# Patient Record
Sex: MALE | Race: WHITE | NOT HISPANIC OR LATINO | Employment: FULL TIME | URBAN - METROPOLITAN AREA
[De-identification: names, ages, dates, MRNs, and addresses within clinical notes are randomized per-mention and may not be internally consistent; named-entity substitution may affect disease eponyms.]

---

## 2024-08-29 ENCOUNTER — OFFICE VISIT (OUTPATIENT)
Dept: PRIMARY CARE | Facility: CLINIC | Age: 59
End: 2024-08-29
Payer: COMMERCIAL

## 2024-08-29 ENCOUNTER — LAB (OUTPATIENT)
Dept: LAB | Facility: LAB | Age: 59
End: 2024-08-29
Payer: COMMERCIAL

## 2024-08-29 VITALS
RESPIRATION RATE: 18 BRPM | OXYGEN SATURATION: 98 % | TEMPERATURE: 97.6 F | HEART RATE: 83 BPM | WEIGHT: 181 LBS | HEIGHT: 72 IN | DIASTOLIC BLOOD PRESSURE: 88 MMHG | BODY MASS INDEX: 24.52 KG/M2 | SYSTOLIC BLOOD PRESSURE: 127 MMHG

## 2024-08-29 DIAGNOSIS — E55.9 VITAMIN D DEFICIENCY: ICD-10-CM

## 2024-08-29 DIAGNOSIS — K63.5 POLYP OF COLON, UNSPECIFIED PART OF COLON, UNSPECIFIED TYPE: ICD-10-CM

## 2024-08-29 DIAGNOSIS — Z00.00 HEALTH CARE MAINTENANCE: Primary | ICD-10-CM

## 2024-08-29 DIAGNOSIS — J45.20 MILD INTERMITTENT ASTHMA WITHOUT COMPLICATION (HHS-HCC): ICD-10-CM

## 2024-08-29 DIAGNOSIS — R94.31 ABNORMAL EKG: ICD-10-CM

## 2024-08-29 DIAGNOSIS — Z12.5 SCREENING PSA (PROSTATE SPECIFIC ANTIGEN): ICD-10-CM

## 2024-08-29 DIAGNOSIS — Z86.19 HISTORY OF HERPES SIMPLEX INFECTION: ICD-10-CM

## 2024-08-29 DIAGNOSIS — Z00.00 HEALTH CARE MAINTENANCE: ICD-10-CM

## 2024-08-29 LAB
25(OH)D3 SERPL-MCNC: 86 NG/ML (ref 30–100)
ALBUMIN SERPL BCP-MCNC: 4.4 G/DL (ref 3.4–5)
ALP SERPL-CCNC: 53 U/L (ref 33–120)
ALT SERPL W P-5'-P-CCNC: 20 U/L (ref 10–52)
ANION GAP SERPL CALC-SCNC: 14 MMOL/L (ref 10–20)
AST SERPL W P-5'-P-CCNC: 25 U/L (ref 9–39)
BASOPHILS # BLD AUTO: 0.02 X10*3/UL (ref 0–0.1)
BASOPHILS NFR BLD AUTO: 0.4 %
BILIRUB SERPL-MCNC: 0.8 MG/DL (ref 0–1.2)
BUN SERPL-MCNC: 22 MG/DL (ref 6–23)
CALCIUM SERPL-MCNC: 9.6 MG/DL (ref 8.6–10.6)
CHLORIDE SERPL-SCNC: 101 MMOL/L (ref 98–107)
CHOLEST SERPL-MCNC: 229 MG/DL (ref 0–199)
CHOLESTEROL/HDL RATIO: 2.8
CO2 SERPL-SCNC: 28 MMOL/L (ref 21–32)
CREAT SERPL-MCNC: 0.92 MG/DL (ref 0.5–1.3)
EGFRCR SERPLBLD CKD-EPI 2021: >90 ML/MIN/1.73M*2
EOSINOPHIL # BLD AUTO: 0.07 X10*3/UL (ref 0–0.7)
EOSINOPHIL NFR BLD AUTO: 1.5 %
ERYTHROCYTE [DISTWIDTH] IN BLOOD BY AUTOMATED COUNT: 12.3 % (ref 11.5–14.5)
EST. AVERAGE GLUCOSE BLD GHB EST-MCNC: 103 MG/DL
GLUCOSE SERPL-MCNC: 80 MG/DL (ref 74–99)
HBA1C MFR BLD: 5.2 %
HCT VFR BLD AUTO: 44.3 % (ref 41–52)
HDLC SERPL-MCNC: 80.9 MG/DL
HGB BLD-MCNC: 14.8 G/DL (ref 13.5–17.5)
IMM GRANULOCYTES # BLD AUTO: 0.01 X10*3/UL (ref 0–0.7)
IMM GRANULOCYTES NFR BLD AUTO: 0.2 % (ref 0–0.9)
LDLC SERPL CALC-MCNC: 136 MG/DL
LYMPHOCYTES # BLD AUTO: 1.23 X10*3/UL (ref 1.2–4.8)
LYMPHOCYTES NFR BLD AUTO: 26.3 %
MCH RBC QN AUTO: 31 PG (ref 26–34)
MCHC RBC AUTO-ENTMCNC: 33.4 G/DL (ref 32–36)
MCV RBC AUTO: 93 FL (ref 80–100)
MONOCYTES # BLD AUTO: 0.37 X10*3/UL (ref 0.1–1)
MONOCYTES NFR BLD AUTO: 7.9 %
NEUTROPHILS # BLD AUTO: 2.97 X10*3/UL (ref 1.2–7.7)
NEUTROPHILS NFR BLD AUTO: 63.7 %
NON HDL CHOLESTEROL: 148 MG/DL (ref 0–149)
NRBC BLD-RTO: 0 /100 WBCS (ref 0–0)
PLATELET # BLD AUTO: 236 X10*3/UL (ref 150–450)
POC APPEARANCE, URINE: CLEAR
POC BILIRUBIN, URINE: NEGATIVE
POC BLOOD, URINE: NEGATIVE
POC COLOR, URINE: YELLOW
POC GLUCOSE, URINE: NEGATIVE MG/DL
POC KETONES, URINE: NEGATIVE MG/DL
POC LEUKOCYTES, URINE: NEGATIVE
POC NITRITE,URINE: NEGATIVE
POC PH, URINE: 6.5 PH
POC PROTEIN, URINE: NEGATIVE MG/DL
POC SPECIFIC GRAVITY, URINE: 1.01
POC UROBILINOGEN, URINE: 0.2 EU/DL
POTASSIUM SERPL-SCNC: 4.5 MMOL/L (ref 3.5–5.3)
PROT SERPL-MCNC: 6.9 G/DL (ref 6.4–8.2)
PSA SERPL-MCNC: 0.82 NG/ML
RBC # BLD AUTO: 4.78 X10*6/UL (ref 4.5–5.9)
SODIUM SERPL-SCNC: 138 MMOL/L (ref 136–145)
TRIGL SERPL-MCNC: 60 MG/DL (ref 0–149)
VLDL: 12 MG/DL (ref 0–40)
WBC # BLD AUTO: 4.7 X10*3/UL (ref 4.4–11.3)

## 2024-08-29 PROCEDURE — 82306 VITAMIN D 25 HYDROXY: CPT

## 2024-08-29 PROCEDURE — 80061 LIPID PANEL: CPT

## 2024-08-29 PROCEDURE — 90715 TDAP VACCINE 7 YRS/> IM: CPT | Performed by: FAMILY MEDICINE

## 2024-08-29 PROCEDURE — 1036F TOBACCO NON-USER: CPT | Performed by: FAMILY MEDICINE

## 2024-08-29 PROCEDURE — 83036 HEMOGLOBIN GLYCOSYLATED A1C: CPT

## 2024-08-29 PROCEDURE — 36415 COLL VENOUS BLD VENIPUNCTURE: CPT

## 2024-08-29 PROCEDURE — 90471 IMMUNIZATION ADMIN: CPT | Performed by: FAMILY MEDICINE

## 2024-08-29 PROCEDURE — 99396 PREV VISIT EST AGE 40-64: CPT | Performed by: FAMILY MEDICINE

## 2024-08-29 PROCEDURE — 80053 COMPREHEN METABOLIC PANEL: CPT

## 2024-08-29 PROCEDURE — 84153 ASSAY OF PSA TOTAL: CPT

## 2024-08-29 PROCEDURE — 81002 URINALYSIS NONAUTO W/O SCOPE: CPT | Performed by: FAMILY MEDICINE

## 2024-08-29 PROCEDURE — 93000 ELECTROCARDIOGRAM COMPLETE: CPT | Performed by: FAMILY MEDICINE

## 2024-08-29 PROCEDURE — 3008F BODY MASS INDEX DOCD: CPT | Performed by: FAMILY MEDICINE

## 2024-08-29 PROCEDURE — 85025 COMPLETE CBC W/AUTO DIFF WBC: CPT

## 2024-08-29 RX ORDER — ALBUTEROL SULFATE 90 UG/1
INHALANT RESPIRATORY (INHALATION)
COMMUNITY
Start: 2020-10-15 | End: 2024-08-29 | Stop reason: SDUPTHER

## 2024-08-29 RX ORDER — VALACYCLOVIR HYDROCHLORIDE 500 MG/1
500 TABLET, FILM COATED ORAL 2 TIMES DAILY
COMMUNITY
End: 2024-08-30 | Stop reason: SDUPTHER

## 2024-08-29 RX ORDER — ALBUTEROL SULFATE 90 UG/1
2 INHALANT RESPIRATORY (INHALATION) EVERY 4 HOURS PRN
Qty: 18 G | Refills: 0 | Status: SHIPPED | OUTPATIENT
Start: 2024-08-29

## 2024-08-29 RX ORDER — BECLOMETHASONE DIPROPIONATE HFA 80 UG/1
2 AEROSOL, METERED RESPIRATORY (INHALATION) 2 TIMES DAILY
COMMUNITY
Start: 2020-10-15 | End: 2024-08-29 | Stop reason: SDUPTHER

## 2024-08-29 RX ORDER — BECLOMETHASONE DIPROPIONATE HFA 80 UG/1
80 AEROSOL, METERED RESPIRATORY (INHALATION) 2 TIMES DAILY PRN
Qty: 10.6 G | Refills: 0 | Status: SHIPPED | OUTPATIENT
Start: 2024-08-29

## 2024-08-29 ASSESSMENT — ENCOUNTER SYMPTOMS
HEADACHES: 0
SHORTNESS OF BREATH: 0

## 2024-08-29 ASSESSMENT — PATIENT HEALTH QUESTIONNAIRE - PHQ9
1. LITTLE INTEREST OR PLEASURE IN DOING THINGS: NOT AT ALL
SUM OF ALL RESPONSES TO PHQ9 QUESTIONS 1 AND 2: 0
2. FEELING DOWN, DEPRESSED OR HOPELESS: NOT AT ALL

## 2024-08-29 NOTE — PROGRESS NOTES
Subjective     Duncan Interiano is a 59 y.o. male who presents for Physical.    HPI     Pt is here today for annual well exam.  He feels well.     His home blood pressures are in good range 110-120s/70s.      He is physically active.  He avoids alcohol.  He maintains a balanced diet.  Patient denies chest pain, shortness of breath, dizziness, headaches or vision changes.     Review of Systems   Respiratory:  Negative for shortness of breath.    Cardiovascular:  Negative for chest pain.   Neurological:  Negative for headaches.       Objective     Vitals:    08/29/24 0931   BP: 127/88   BP Location: Left arm   Patient Position: Sitting   Pulse: 83   Resp: 18   Temp: 36.4 °C (97.6 °F)   TempSrc: Temporal   SpO2: 98%   Weight: 82.1 kg (181 lb)   Height: 1.829 m (6')        Current Outpatient Medications   Medication Instructions    albuterol 90 mcg/actuation inhaler 2 puffs, inhalation, Every 4 hours PRN    beclomethasone dipropionate (Qvar RediHaler) 80 mcg/actuation inhaler 1 Inhalation, inhalation, 2 times daily PRN    valACYclovir (VALTREX) 500 mg, oral, 2 times daily        Allergies   Allergen Reactions    Iodinated Contrast Media Other        Past Surgical History:   Procedure Laterality Date    OTHER SURGICAL HISTORY  11/28/2021    Colonoscopy    VASECTOMY          Social History     Tobacco Use    Smoking status: Never    Smokeless tobacco: Never   Vaping Use    Vaping status: Never Used   Substance Use Topics    Alcohol use: Not Currently    Drug use: Never        Social History     Substance and Sexual Activity   Alcohol Use Not Currently       Family History   Problem Relation Name Age of Onset    Breast cancer Mother      Pancreatic cancer Mother      Prostate cancer Father      Atrial fibrillation Father      Throat cancer Father          Immunization History   Administered Date(s) Administered    Influenza, Unspecified 10/25/2011    Influenza, seasonal, injectable 10/15/2020, 12/30/2021    Pfizer Purple Cap  SARS-CoV-2 03/28/2021, 04/18/2021, 11/30/2021    Tdap vaccine, age 7 year and older (BOOSTRIX, ADACEL) 08/29/2024    Zoster vaccine, recombinant, adult (SHINGRIX) 10/15/2020, 12/30/2021        Physical Exam  Vitals reviewed.   Constitutional:       General: He is not in acute distress.     Appearance: Normal appearance. He is not ill-appearing.   HENT:      Head: Normocephalic and atraumatic.      Right Ear: Tympanic membrane, ear canal and external ear normal.      Left Ear: Tympanic membrane, ear canal and external ear normal.      Nose: Nose normal.      Mouth/Throat:      Mouth: Mucous membranes are moist.      Pharynx: No oropharyngeal exudate or posterior oropharyngeal erythema.   Eyes:      Conjunctiva/sclera: Conjunctivae normal.   Neck:      Thyroid: No thyroid mass or thyromegaly.   Cardiovascular:      Rate and Rhythm: Normal rate and regular rhythm.      Heart sounds: No murmur heard.  Pulmonary:      Effort: No respiratory distress.      Breath sounds: Normal breath sounds. No wheezing, rhonchi or rales.   Abdominal:      General: Abdomen is flat. There is no distension.      Palpations: Abdomen is soft. There is no mass.      Tenderness: There is no abdominal tenderness.   Genitourinary:     Testes: Normal.   Musculoskeletal:         General: Normal range of motion.   Lymphadenopathy:      Cervical: No cervical adenopathy.   Skin:     General: Skin is warm and dry.      Findings: No lesion or rash.   Neurological:      Mental Status: He is alert and oriented to person, place, and time. Mental status is at baseline.   Psychiatric:         Mood and Affect: Mood normal.         Behavior: Behavior normal.         Assessment & Plan  Health care maintenance  Well Exam     Colon screening - Colonoscopy is due , last done 12/2021, repeat 3-5 years - colonoscopy ordered today.     Vaccines - utd with shingrix.  Tdap given today     I recommend yearly flu vaccine and routine COVID vaccinations as indicated      Complete labs    Healthy diet, routine exercise was discussed with patient      Follow up in 1 year or sooner if needed      Orders:    POCT UA (nonautomated) manually resulted    ECG 12 lead (Clinic Performed)    Comprehensive Metabolic Panel; Future    Lipid Panel; Future    CBC and Auto Differential; Future    Hemoglobin A1C; Future    Vitamin D deficiency    Orders:    Vitamin D 25-Hydroxy,Total (for eval of Vitamin D levels); Future    Screening PSA (prostate specific antigen)    Orders:    Prostate Specific Antigen; Future    Polyp of colon, unspecified part of colon, unspecified type    Orders:    Colonoscopy Screening; Average Risk Patient; Future    Mild intermittent asthma without complication ([facility])  Rare asthma symptoms.  Pt requesting refills.   Orders:    beclomethasone dipropionate (Qvar RediHaler) 80 mcg/actuation inhaler; Inhale 1 Inhalation 2 times a day as needed (wheezing, cough, asthma).    albuterol 90 mcg/actuation inhaler; Inhale 2 puffs every 4 hours if needed for wheezing.    Abnormal EKG  Reading of possible septal infarct, age undetermined, likely over read/lead placement however due to age and previous EKG reading with similar finding I will get patient checked by cardiology.  Pt agreeable.  He is asymptomatic, hx of calcium score of 0 in 2022.   Orders:    Referral to Cardiology; Future       CT calcium score of 0 in 2/2022

## 2024-08-29 NOTE — ASSESSMENT & PLAN NOTE
Rare asthma symptoms.  Pt requesting refills.   Orders:    beclomethasone dipropionate (Qvar RediHaler) 80 mcg/actuation inhaler; Inhale 1 Inhalation 2 times a day as needed (wheezing, cough, asthma).    albuterol 90 mcg/actuation inhaler; Inhale 2 puffs every 4 hours if needed for wheezing.

## 2024-08-30 RX ORDER — VALACYCLOVIR HYDROCHLORIDE 500 MG/1
TABLET, FILM COATED ORAL
Qty: 30 TABLET | Refills: 0 | Status: SHIPPED | OUTPATIENT
Start: 2024-08-30

## 2024-09-02 DIAGNOSIS — E78.5 HYPERLIPIDEMIA, MILD: Primary | ICD-10-CM

## 2024-09-11 ENCOUNTER — TELEPHONE (OUTPATIENT)
Dept: CARDIOLOGY | Facility: CLINIC | Age: 59
End: 2024-09-11
Payer: COMMERCIAL

## 2024-09-26 ENCOUNTER — APPOINTMENT (OUTPATIENT)
Dept: CARDIOLOGY | Facility: CLINIC | Age: 59
End: 2024-09-26
Payer: COMMERCIAL

## 2024-09-26 VITALS
DIASTOLIC BLOOD PRESSURE: 90 MMHG | SYSTOLIC BLOOD PRESSURE: 160 MMHG | BODY MASS INDEX: 23.83 KG/M2 | HEIGHT: 73 IN | WEIGHT: 179.8 LBS | HEART RATE: 78 BPM

## 2024-09-26 DIAGNOSIS — Z78.9 NEVER SMOKED CIGARETTES: ICD-10-CM

## 2024-09-26 DIAGNOSIS — Z76.89 ENCOUNTER TO ESTABLISH CARE: ICD-10-CM

## 2024-09-26 DIAGNOSIS — R94.31 ABNORMAL EKG: Primary | ICD-10-CM

## 2024-09-26 DIAGNOSIS — E78.2 MIXED HYPERLIPIDEMIA: ICD-10-CM

## 2024-09-26 DIAGNOSIS — R06.02 SHORTNESS OF BREATH: ICD-10-CM

## 2024-09-26 PROCEDURE — 3008F BODY MASS INDEX DOCD: CPT | Performed by: INTERNAL MEDICINE

## 2024-09-26 PROCEDURE — 93000 ELECTROCARDIOGRAM COMPLETE: CPT | Performed by: INTERNAL MEDICINE

## 2024-09-26 PROCEDURE — 99204 OFFICE O/P NEW MOD 45 MIN: CPT | Performed by: INTERNAL MEDICINE

## 2024-09-26 PROCEDURE — 1036F TOBACCO NON-USER: CPT | Performed by: INTERNAL MEDICINE

## 2024-09-26 RX ORDER — BISMUTH SUBSALICYLATE 262 MG
1 TABLET,CHEWABLE ORAL DAILY
COMMUNITY

## 2024-09-26 NOTE — PROGRESS NOTES
CARDIOLOGY OFFICE VISIT      CHIEF COMPLAINT  Chief Complaint   Patient presents with    New Patient Visit     Here to establish care after abnormal EKG        HISTORY OF PRESENT ILLNESS    Patient is a 59-year-old  male with past medical history significant for borderline hypertension, hyperlipidemia, asthma who presents for cardiac evaluation.    Patient had recent abnormal EKG in PCPs office.  He denies chest pain.  He does get some dyspnea exertion usually in the fall that he attributes to his asthma that is relieved with using metered-dose inhaler.  He has infrequent palpitations perhaps twice per year lasting seconds in duration.  Denies nausea, vomiting, dizziness, near-syncope, britt syncope, edema.  He is very active exercising.  He will do a run/Brents twice a week for 40 minutes.  He will do weight lifting for 30 to 40 minutes.  In the winter he is a [employer] bike.    Per the Garcia calculator patient has 2.1% 10-year risk with coronary calcium score, 4.5% 10-year risk without coronary calcium score    Past surgical history:  Colon polypectomy  Right ACL repair  Vasectomy    Social history:  Lifetime non-smoker  Denies alcohol use    Family history:  Father  81 with history of paroxysmal atrial fibrillation, throat cancer, prostate cancer  Mother  70 percent 10-year risk without coronary calcium score    Past surgical history:  Colon polypectomy  Right ACL repair  Vasectomy    Social history:  Lifetime non-smoker  Denies alcohol use    Family history:  Father  81 with history of paroxysmal atrial fibrillation, throat cancer, prostate cancer  Mother  70 breast cancer, pancreatic cancer  Brother history of thyroid disease    Review of systems have been reviewed and are negative, noncontributory, as previously mentioned x 12 systems.    I personally reviewed EKG 2024: Normal sinus rhythm, low voltage QRS, septal Q waves    Assessment:  Abnormal  EKG  Dyspnea  Borderline hypertension  Hyperlipidemia  Asthma    Recommendations:  To further evaluate findings and abnormalities will proceed with graded exercise stress test and echocardiogram.  Maintain blood pressure diary.  Follow-up after testing.    Please excuse any errors in grammar or translation related to this dictation.  Voice recognition software was utilized to prepare this document.      Recent Cardiovascular Testing:  The following results have been reviewed and updated.     2/3/22 coronary calcium score 0    August 29, 2024 labs: Total cholesterol 229, HDL 81, , triglycerides of 60        VITALS  Vitals:    09/26/24 0852   BP: 160/90   Pulse: 78     Wt Readings from Last 4 Encounters:   09/26/24 81.6 kg (179 lb 12.8 oz)   08/29/24 82.1 kg (181 lb)   08/08/22 84.5 kg (186 lb 6 oz)   08/02/22 83.9 kg (185 lb)       PHYSICAL EXAM:  GENERAL:  Well developed, well nourished, in no acute distress.  CHEST:  Symmetric and nontender.  NEURO/PSYCH:  Alert and oriented times three with approppriate behavior and responses.  NECK:  Supple, no JVD, no bruit.  LUNGS:  Clear to auscultation bilaterally, normal respiratory effort.  HEART:  Rate and rhythm regular with no evident murmur, no gallop appreciated.                   There are no rubs, clicks or heaves.  EXTREMITIES:  Warm with good color, no clubbing or cyanosis.  There is no edema noted.  PERIPHERAL VASCULAR:  Pulses present and equally palpable; 2+ throughout.    ASSESSMENT AND PLAN  Diagnoses and all orders for this visit:  Abnormal EKG  -     Referral to Cardiology  -     ECG 12 lead (Clinic Performed)  -     Stress Test; Future  -     Transthoracic Echo (TTE) Complete; Future  Encounter to establish care  -     ECG 12 lead (Clinic Performed)  Shortness of breath  -     Stress Test; Future  -     Transthoracic Echo (TTE) Complete; Future  Mixed hyperlipidemia  BMI 23.0-23.9, adult  Never smoked cigarettes      Past Medical History  History  "reviewed. No pertinent past medical history.    Social History  Social History     Tobacco Use    Smoking status: Never    Smokeless tobacco: Never   Vaping Use    Vaping status: Never Used   Substance Use Topics    Alcohol use: Not Currently    Drug use: Never       Family History     Family History   Problem Relation Name Age of Onset    Breast cancer Mother      Pancreatic cancer Mother      Prostate cancer Father      Atrial fibrillation Father      Throat cancer Father      Heart attack Maternal Grandfather  40 - 49        Allergies:  Allergies   Allergen Reactions    Hay Fever And Allergy Relief Other    Iodinated Contrast Media Other        Outpatient Medications:  Current Outpatient Medications   Medication Instructions    albuterol 90 mcg/actuation inhaler 2 puffs, inhalation, Every 4 hours PRN    beclomethasone dipropionate (Qvar RediHaler) 80 mcg/actuation inhaler 1 Inhalation, inhalation, 2 times daily PRN    multivitamin tablet 1 tablet, oral, Daily    valACYclovir (Valtrex) 500 mg tablet Four tabs every twelve hours x 1 day.  Take at symptom onset        Recent Lab Results:    CMP:    Lab Results   Component Value Date     08/29/2024    K 4.5 08/29/2024     08/29/2024    CO2 28 08/29/2024    BUN 22 08/29/2024    CREATININE 0.92 08/29/2024    GLUCOSE 80 08/29/2024    CALCIUM 9.6 08/29/2024       Magnesium:    No results found for: \"MG\"    Lipid Profile:    Lab Results   Component Value Date    TRIG 60 08/29/2024    HDL 80.9 08/29/2024    LDLCALC 136 (H) 08/29/2024       TSH:    No results found for: \"TSH\"    BNP:   No results found for: \"BNP\"     PT/INR:    No results found for: \"PROTIME\", \"INR\"    HgBA1c:    Lab Results   Component Value Date    HGBA1C 5.2 08/29/2024       BMP:  Lab Results   Component Value Date     08/29/2024     08/02/2022     12/30/2021     10/15/2020    K 4.5 08/29/2024    K 4.2 08/02/2022    K 4.6 12/30/2021    K 4.2 10/15/2020     " "08/29/2024     08/02/2022     12/30/2021     10/15/2020    CO2 28 08/29/2024    CO2 31 08/02/2022    CO2 31 12/30/2021    CO2 30 10/15/2020    BUN 22 08/29/2024    BUN 13 08/02/2022    BUN 15 12/30/2021    BUN 17 10/15/2020    CREATININE 0.92 08/29/2024    CREATININE 1.03 08/02/2022    CREATININE 1.04 12/30/2021    CREATININE 1.04 10/15/2020       CBC:  Lab Results   Component Value Date    WBC 4.7 08/29/2024    WBC 5.6 08/02/2022    WBC 5.2 12/30/2021    WBC 6.9 10/15/2020    RBC 4.78 08/29/2024    RBC 4.78 08/02/2022    RBC 4.66 12/30/2021    RBC 4.39 (L) 10/15/2020    HGB 14.8 08/29/2024    HGB 14.7 08/02/2022    HGB 14.7 12/30/2021    HGB 13.7 10/15/2020    HCT 44.3 08/29/2024    HCT 44.3 08/02/2022    HCT 45.5 12/30/2021    HCT 41.7 10/15/2020    MCV 93 08/29/2024    MCV 93 08/02/2022    MCV 98 12/30/2021    MCV 95 10/15/2020    MCH 31.0 08/29/2024    MCHC 33.4 08/29/2024    MCHC 33.2 08/02/2022    MCHC 32.3 12/30/2021    MCHC 32.9 10/15/2020    RDW 12.3 08/29/2024    RDW 12.1 08/02/2022    RDW 12.6 12/30/2021    RDW 12.3 10/15/2020     08/29/2024     08/02/2022     12/30/2021     10/15/2020       Cardiac Enzymes:    No results found for: \"TROPHS\"    Hepatic Function Panel:    Lab Results   Component Value Date    ALKPHOS 53 08/29/2024    ALT 20 08/29/2024    AST 25 08/29/2024    PROT 6.9 08/29/2024    BILITOT 0.8 08/29/2024           Yogesh Shaw, DO        "

## 2024-09-26 NOTE — PATIENT INSTRUCTIONS
Continue same medications and treatments.   Patient educated on proper medication use.   Patient educated on risk factor modification.   Please bring any lab results from other providers / physicians to your next appointment.     Please bring all medicines, vitamins, and herbal supplements with you when you come to the office.     Prescriptions will not be filled unless you are compliant with your follow up appointments or have a follow up appointment scheduled as per instruction of your physician. Refills should be requested at the time of your visit.    Check your blood pressure 3 times a week for 2 weeks prior to your next office visit. Bring your blood pressure readings and your blood pressure machine to your next office visit.     ECHO ORDERED  STRESS TEST (GXT) ORDERED    FOLLOW UP AFTER TESTING    IAriadna LPN, am scribing for and in the presence of Dr. Yogesh Shaw, DO, FACC

## 2024-09-27 ENCOUNTER — APPOINTMENT (OUTPATIENT)
Dept: PRIMARY CARE | Facility: CLINIC | Age: 59
End: 2024-09-27
Payer: COMMERCIAL

## 2025-03-06 ENCOUNTER — OFFICE VISIT (OUTPATIENT)
Dept: URGENT CARE | Age: 60
End: 2025-03-06
Payer: COMMERCIAL

## 2025-03-06 ENCOUNTER — ANCILLARY PROCEDURE (OUTPATIENT)
Dept: URGENT CARE | Age: 60
End: 2025-03-06
Payer: COMMERCIAL

## 2025-03-06 VITALS
HEART RATE: 72 BPM | TEMPERATURE: 98.5 F | DIASTOLIC BLOOD PRESSURE: 88 MMHG | BODY MASS INDEX: 23.03 KG/M2 | HEIGHT: 72 IN | OXYGEN SATURATION: 99 % | RESPIRATION RATE: 16 BRPM | SYSTOLIC BLOOD PRESSURE: 132 MMHG | WEIGHT: 170 LBS

## 2025-03-06 DIAGNOSIS — K59.00 CONSTIPATION, UNSPECIFIED CONSTIPATION TYPE: Primary | ICD-10-CM

## 2025-03-06 DIAGNOSIS — J45.20 MILD INTERMITTENT ASTHMA, UNSPECIFIED WHETHER COMPLICATED (HHS-HCC): ICD-10-CM

## 2025-03-06 DIAGNOSIS — K59.00 CONSTIPATION, UNSPECIFIED CONSTIPATION TYPE: ICD-10-CM

## 2025-03-06 PROCEDURE — 1036F TOBACCO NON-USER: CPT | Performed by: FAMILY MEDICINE

## 2025-03-06 PROCEDURE — 74019 RADEX ABDOMEN 2 VIEWS: CPT | Performed by: FAMILY MEDICINE

## 2025-03-06 PROCEDURE — 3008F BODY MASS INDEX DOCD: CPT | Performed by: FAMILY MEDICINE

## 2025-03-06 PROCEDURE — 99204 OFFICE O/P NEW MOD 45 MIN: CPT | Performed by: FAMILY MEDICINE

## 2025-03-06 ASSESSMENT — PAIN SCALES - GENERAL: PAINLEVEL_OUTOF10: 3

## 2025-03-06 NOTE — PATIENT INSTRUCTIONS
Encourage activity, fluids and fiber  May use magnesium citrate, one half bottle today and repeat tomorrow if needed  Continue MiraLAX as before  Dulcolax or glycerin suppositories if needed  Go to ER for abdominal pains or repeated vomiting  See PCP in 5 to 7 days if no improvement

## 2025-03-06 NOTE — PROGRESS NOTES
"Subjective   Patient ID: Duncan Interiano is a 59 y.o. male. They present today with a chief complaint of Constipation (X 6 days).    History of Present Illness  HPI  Patient presents with 6 days of \"constipation\".  He states that he was on vacation a week ago but denies any significant change in diet or medicines.  He states he is urinating normally and denies nausea or vomiting.  Stools are small, infrequent and occasionally hard.  No black or bloody stools.  Minimal abdominal pain or distention.  He has taken MiraLAX and Dulcolax with no significant improvement.  Past Medical History  Allergies as of 03/06/2025 - Reviewed 03/06/2025   Allergen Reaction Noted    Hay fever and allergy relief Other 09/26/2024    Iodinated contrast media Other 08/29/2024       (Not in a hospital admission)       No past medical history on file.    Past Surgical History:   Procedure Laterality Date    OTHER SURGICAL HISTORY  11/28/2021    Colonoscopy    OTHER SURGICAL HISTORY      cyst removal ankle as child    VASECTOMY          reports that he has never smoked. He has never used smokeless tobacco. He reports that he does not currently use alcohol. He reports that he does not use drugs.    Review of Systems  Review of Systems       As in history of present illness                        Objective    Vitals:    03/06/25 0925   BP: 132/88   Pulse: 72   Resp: 16   Temp: 36.9 °C (98.5 °F)   TempSrc: Oral   SpO2: 99%   Weight: 77.1 kg (170 lb)   Height: 1.829 m (6')     No LMP for male patient.    Physical Exam  General: Vitals noted, no distress. Afebrile.   EENT: TMs clear. Posterior oropharynx unremarkable.   Abdomen: Soft, nonsurgical. Nontender. No peritoneal signs. Normoactive bowel sounds.   Extremities: No peripheral edema. Negative Homans bilaterally, no cords.   Skin: No rash.   Neuro: No focal neurologic deficits, NIH score of 0.    Procedures    Point of Care Test & Imaging Results from this visit  No results found for this visit " on 03/06/25.   No results found.    Diagnostic study results (if any) were reviewed by Eh Nolasco MD.    Assessment/Plan   Allergies, medications, history, and pertinent labs/EKGs/Imaging reviewed by Eh Nolasco MD.     Medical Decision Making  At time of discharge patient was clinically well-appearing and HDS for outpatient management. The patient and/or family was educated regarding diagnosis, supportive care, OTC and Rx medications. The patient and/or family was given the opportunity to ask questions prior to discharge.  They verbalized understanding of my discussion of the plans for treatment, expected course, indications to return to  or seek further evaluation in ED, and the need for timely follow up as directed.   They were provided with a work/school excuse if requested.    Orders and Diagnoses  Diagnoses and all orders for this visit:  Constipation, unspecified constipation type  -     XR abdomen 2 views supine and erect or decub; Future  Mild intermittent asthma, unspecified whether complicated ([facility])      Medical Admin Record      Patient disposition: Home    Electronically signed by Eh Nolasco MD  10:12 AM

## 2025-09-04 ENCOUNTER — APPOINTMENT (OUTPATIENT)
Facility: CLINIC | Age: 60
End: 2025-09-04
Payer: COMMERCIAL

## 2025-09-04 ASSESSMENT — PATIENT HEALTH QUESTIONNAIRE - PHQ9
1. LITTLE INTEREST OR PLEASURE IN DOING THINGS: NOT AT ALL
2. FEELING DOWN, DEPRESSED OR HOPELESS: NOT AT ALL
SUM OF ALL RESPONSES TO PHQ9 QUESTIONS 1 AND 2: 0

## 2025-09-04 ASSESSMENT — ENCOUNTER SYMPTOMS
SHORTNESS OF BREATH: 0
HEADACHES: 0

## 2026-09-04 ENCOUNTER — APPOINTMENT (OUTPATIENT)
Facility: CLINIC | Age: 61
End: 2026-09-04
Payer: COMMERCIAL